# Patient Record
(demographics unavailable — no encounter records)

---

## 2024-10-29 NOTE — DISCUSSION/SUMMARY
[FreeTextEntry1] : Patient is 16yo female with some anxiety and depression seen for headache and dizziness She is not interested in counseling at this time but had been in counseling in middle school No medication at this time Behavioral Health history reviewed and anticipatory guidance provided

## 2024-10-29 NOTE — HISTORY OF PRESENT ILLNESS
[FreeTextEntry6] : Patient is 14yo female brought in due to dizziness and headache - was sitting on the floor outside the [facility] She notes that she has been having daily h/a for 2 years - occ frontal or bilateral parietal She notes nausea but no emesis; she has no phono or photo phobia She notes her vision zooms out during the headache She takes tylenol for headaches sometimes with some effectiveness temporarily She gets dizzy when headaches are severe or disoriented or discoordinated  Bkfst - cereal -vanilla oaty with milk at home Lunch - salad and water

## 2024-10-29 NOTE — RISK ASSESSMENT
[Uses tobacco] : does not use tobacco [Uses drugs] : does not use drugs  [Drinks alcohol] : does not drink alcohol [Has had sexual intercourse] : has not had sexual intercourse [Gets depressed, anxious, or irritable/has mood swings] : gets depressed, anxious, or irritable/has mood swings [Has thought about hurting self or considered suicide] : has not thought about hurting self or considered suicide [With Teen] : teen [de-identified] : lives with mother, aunt, uncle and 2 siblings 12y and 9yo - gets along well [de-identified] : 10th grade - passed day classes

## 2024-10-29 NOTE — REVIEW OF SYSTEMS
[Headache] : headache [Nasal Discharge] : no nasal discharge [Nasal Congestion] : no nasal congestion [Sore Throat] : no sore throat [Lightheadness] : lightheadness [Negative] : Skin